# Patient Record
Sex: FEMALE | Race: ASIAN
[De-identification: names, ages, dates, MRNs, and addresses within clinical notes are randomized per-mention and may not be internally consistent; named-entity substitution may affect disease eponyms.]

---

## 2017-09-11 ENCOUNTER — HOSPITAL ENCOUNTER (EMERGENCY)
Dept: HOSPITAL 25 - UCEAST | Age: 52
Discharge: HOME | End: 2017-09-11
Payer: COMMERCIAL

## 2017-09-11 ENCOUNTER — HOSPITAL ENCOUNTER (EMERGENCY)
Dept: HOSPITAL 25 - ED | Age: 52
Discharge: HOME | End: 2017-09-11
Payer: COMMERCIAL

## 2017-09-11 VITALS — DIASTOLIC BLOOD PRESSURE: 77 MMHG | SYSTOLIC BLOOD PRESSURE: 131 MMHG

## 2017-09-11 VITALS — SYSTOLIC BLOOD PRESSURE: 136 MMHG | DIASTOLIC BLOOD PRESSURE: 89 MMHG

## 2017-09-11 DIAGNOSIS — R22.42: Primary | ICD-10-CM

## 2017-09-11 DIAGNOSIS — S80.812A: ICD-10-CM

## 2017-09-11 DIAGNOSIS — M79.662: ICD-10-CM
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
## 2017-09-11 DIAGNOSIS — S80.12XA: Primary | ICD-10-CM
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
## 2017-09-11 PROCEDURE — 99201: CPT

## 2017-09-11 PROCEDURE — G0463 HOSPITAL OUTPT CLINIC VISIT: HCPCS

## 2017-09-11 PROCEDURE — 99282 EMERGENCY DEPT VISIT SF MDM: CPT

## 2017-09-11 NOTE — RAD
INDICATION: Pain and swelling.     



COMPARISON: None

 

TECHNIQUE: Duplex interrogation of the Lowerextremity was performed.



FINDINGS: 



Deep veins: The common femoral, great saphenous, profunda femoris, proximal, mid, and

distal deep femoral, popliteal, posterior tibial, and peroneal veins are patent. There is

normal compressibility, augmentation, and phasic flow.



Superficial veins: There are no findings of superficial thrombophlebitis.



Popliteal fossa:There is no evidence of a popliteal cyst.



Soft tissues: There is a tiny (1.9 x 0.3 x 1.1 cm), elliptical, avascular hypoechoic

structure in the anterior lower extremity which may represent a small hematoma.



IMPRESSION:  SUSPECT SMALL HEMATOMA. NO EVIDENCE OF ACUTE DEEP VENOUS THROMBOSIS..

## 2017-09-11 NOTE — ED
Lower Extremity





- HPI Summary


HPI Summary: 


Pt here w/ Lt shin injury and swelling - was in [place] and as she tried stepping 

up onto a cement platform w/ heavy bags in her hands, couldn't clear her Lt 

foot to step up and struck shin against cement step edge. Minor abrasion and 

swelling. She flew back from [place] 3 days ago and swelling worse, noticed in 

her ankle area. Takes ASA 81mg daily and is on HRT - denies smoking, h/o CA, h/

o clotting. Was seen at  who did not order any studies but sent her here for r

/o DVT as they do not have U/S services there today. Pt denies limited ROM and 

only has pain w/ weight bearing, most notably w/ descending stairs. 





- History of Current Complaint


Chief Complaint: EDExtremityLower


Stated Complaint: LT LEG INJURY-SENT FROM 


Time Seen by Provider: 09/11/17 18:45


Hx Obtained From: Patient, Family/Caretaker - 


Hx Last Menstrual Period: 6 months ago


Pain Intensity: 6





- Allergies/Home Medications


Allergies/Adverse Reactions: 


 Allergies











Allergy/AdvReac Type Severity Reaction Status Date / Time


 


Codeine Allergy  Rash Verified 09/11/17 17:36


 


Latex Allergy  See Comment Verified 09/11/17 17:36


 


Sulfamethoxazole Allergy  Rash Verified 09/11/17 17:36





w/Trimethoprim     





[From Bactrim]     














PMH/Surg Hx/FS Hx/Imm Hx


Previously Healthy: Yes


Endocrine/Hematology History: 


   Denies: Hx Anticoagulant Therapy, Hx Blood Disorders, Hx Coagulopothy


Respiratory History: Reports: Hx Asthma


 History: Reports: Hx Kidney Infection


Neurological History: Reports: Hx Migraine - takes butterbur


Infectious Disease History: 


   Denies: Traveled Outside the [place] in Last 30 Days





- Family History


Known Family History: Positive: None





- Social History


Occupation: Employed Full-time - professor


Lives: With Family


Alcohol Use: None


Hx Substance Use: No


Substance Use Type: Reports: None


Hx Tobacco Use: No


Smoking Status (MU): Never Smoked Tobacco





Review of Systems


Constitutional: Negative


Negative: Fever, Fatigue


Cardiovascular: Negative


Negative: Palpitations, Chest Pain


Respiratory: Negative


Negative: Shortness Of Breath, Cough


Positive: no symptoms reported


Musculoskeletal: Other - see HPI


Skin: Other - see HPI


Neurological: Negative


Negative: Weakness, Paresthesia, Numbness


Psychological: Normal


All Other Systems Reviewed And Are Negative: Yes





Physical Exam


Triage Information Reviewed: Yes


Vital Signs On Initial Exam: 


 Initial Vitals











Temp Pulse Resp BP Pulse Ox


 


 99.4 F   77   18   129/86   99 


 


 09/11/17 18:33  09/11/17 18:33  09/11/17 18:33  09/11/17 18:33  09/11/17 18:33











Vital Signs Reviewed: Yes


Appearance: Positive: Well-Appearing, No Pain Distress, Well-Nourished


Skin: Positive: Warm, Dry - minor superficial abrasion over Lt anterior tibial 

region w/ underlying edema - no ecchymosis, no bleeding


Head/Face: Positive: Normal Head/Face Inspection


Eyes: Positive: EOMI


ENT: Positive: Hearing grossly normal


Respiratory/Lung Sounds: Positive: Breath Sounds Present


Cardiovascular: Positive: Pulses are Symmetrical in both Upper and Lower 

Extremities, Leg Edema Left - (-) Katherine's sign


Musculoskeletal: Positive: Normal, Strength/ROM Intact, Pain @ - mild TTP over 

direct wound area


Neurological: Positive: Normal, Sensory/Motor Intact, Alert, Oriented to Person 

Place, Time, CN Intact II-III, Reflexes Intact


Psychiatric: Positive: Normal





Diagnostics





- Vital Signs


 Vital Signs











  Temp Pulse Resp BP Pulse Ox


 


 09/11/17 18:33  99.4 F  77  18  129/86  99














- Laboratory


Lab Statement: Any lab studies that have been ordered have been reviewed, and 

results considered in the medical decision making process.





Lower Extremity Course/Dx





- Diagnoses


Provider Diagnoses: 


 Traumatic hematoma of left lower leg








Discharge





- Discharge Plan


Condition: Stable


Disposition: HOME


Patient Education Materials:  Hematoma (ED)


Referrals: 


Flory Raymond MD [Primary Care Provider] - 


Additional Instructions: 


Wash wound daily with soap and water - rinse well and apply triple antibiotic 

ointment after with bandaid.


For swelling, rest, ice, elevate


You may take ibuprofen with food for pain/swelling


Follow-up with PCP if pain persists or worsens

## 2017-09-11 NOTE — RAD
INDICATION: Left leg injury     



COMPARISON: None



TECHNIQUE: AP, lateral, and oblique views were obtained.



FINDINGS: The bony structures, joint spaces, and soft tissues are normal for age.



IMPRESSION: NEGATIVE EXAMINATION.

## 2017-09-11 NOTE — UC
Lower Extremity/Ankle HPI





- HPI Summary


HPI Summary: 





51 YEAR OLD FEMALE PRESENTS WITH SEVERE LEFT CALF PAIN AND SWELLING AFTER BLUNT 

TRAUMA IN COMBINATION WITH A 30 HOUR PLANE RIDE FROM [place]. 





- History of Current Complaint


Chief Complaint: UCLowerExtremity


Stated Complaint: LEG INJURY


Time Seen by Provider: 09/11/17 17:45


Hx Obtained From: Patient


Hx Last Menstrual Period: 6 months ago


Onset/Duration: Gradual Onset


Severity Initially: Moderate


Severity Currently: Moderate


Pain Scale Used: 0-10 Numeric - 8


Aggravating Factor(s): Standing, Ambulation





- Allergies/Home Medications


Allergies/Adverse Reactions: 


 Allergies











Allergy/AdvReac Type Severity Reaction Status Date / Time


 


Codeine Allergy  Rash Verified 09/11/17 17:36


 


Latex Allergy  See Comment Verified 09/11/17 17:36


 


Sulfamethoxazole Allergy  Rash Verified 09/11/17 17:36





w/Trimethoprim     





[From Bactrim]     











Home Medications: 


 Home Medications





Butterbur 1 cap PO ONCE 09/11/17 [History Confirmed 09/11/17]


Fexofenadine-Pseudoephedrine [Allegra-D 24 Hour Allergy 180-240 mg] 1 cap PO 

ONCE 09/11/17 [History Confirmed 09/11/17]


Hormone Replacement Therapy 1 cap PO DAILY 09/11/17 [History Confirmed 09/11/17]











PMH/Surg Hx/FS Hx/Imm Hx


Previously Healthy: Yes





- Surgical History


Surgical History: None





- Social History


Alcohol Use: None


Substance Use Type: None


Smoking Status (MU): Never Smoked Tobacco





Review of Systems


Constitutional: Negative


Skin: Negative


Eyes: Negative


ENT: Negative


Respiratory: Negative


Cardiovascular: Negative


Gastrointestinal: Negative


Genitourinary: Negative


Motor: Negative


Neurovascular: Negative


Musculoskeletal: Other: - LEFT CALF SWELLING/PAIN


Neurological: Negative


Psychological: Negative


All Other Systems Reviewed And Are Negative: Yes





Physical Exam


Triage Information Reviewed: Yes


Vital Signs: 


 Initial Vital Signs











Temp  36.3 C   09/11/17 17:30


 


Pulse  90   09/11/17 17:30


 


Resp  18   09/11/17 17:30


 


BP  136/89   09/11/17 17:30


 


Pulse Ox  98   09/11/17 17:30











Eye Exam: Normal


ENT Exam: Normal


Dental Exam: Normal


Neck exam: Normal


Neck: Positive: 1


Respiratory Exam: Normal


Cardiovascular Exam: Normal


Abdominal Exam: Normal


Musculoskeletal Exam: Normal


Neurological Exam: Normal


Psychological Exam: Normal


Skin Exam: Normal





Lower Extremity Course/Dx





- Differential Dx/Diagnosis


Provider Diagnoses: LEFT CALF PAIN/SWELLING





Discharge





- Discharge Plan


Condition: Stable


Disposition: HOME


Patient Education Materials:  Deep Venous Thrombosis (ED), Leg Edema (ED)


Referrals: 


Flory Raymond MD [Primary Care Provider] - 


Additional Instructions: 


PLEASE GO TO ER TO RULE OUT DVT OF LEFT LEG

## 2019-04-24 ENCOUNTER — HOSPITAL ENCOUNTER (OUTPATIENT)
Dept: HOSPITAL 25 - OR | Age: 54
Discharge: HOME | End: 2019-04-24
Attending: SURGERY
Payer: COMMERCIAL

## 2019-04-24 VITALS — DIASTOLIC BLOOD PRESSURE: 95 MMHG | SYSTOLIC BLOOD PRESSURE: 138 MMHG

## 2019-04-24 DIAGNOSIS — K21.9: ICD-10-CM

## 2019-04-24 DIAGNOSIS — K81.1: Primary | ICD-10-CM

## 2019-04-24 DIAGNOSIS — J45.909: ICD-10-CM

## 2019-04-24 PROCEDURE — 81025 URINE PREGNANCY TEST: CPT

## 2019-04-24 PROCEDURE — 88304 TISSUE EXAM BY PATHOLOGIST: CPT

## 2019-04-24 NOTE — OP
Operative Report - Blank





- Operative Report


Date of Operation: 04/24/19


Note: 





Brief Operative Note


Preop Dx: symptomatic cholelithiasis


Postop Dx: same


Procedure: laparoscopic cholecystectomy


Anesthesia: GET


Surgeon: Doug


Assistant: MIGUEL Lopez


Fluids: 1450 ml RL


EBL: < 20 ml


Specimen: gallbladder


Drains: none


Findings: dictated

## 2019-05-07 NOTE — OP
CC:  [facility]; primary care physician *

 

DATE OF OPERATION:  04/24/19 - [facility]

 

DATE OF BIRTH:  [birth year]

 

SURGEON:  Cheng Camacho MD.

 

ASSISTANT:  John Ingram.

 

ANESTHESIA:  General anesthesia.

 

PRE-OP DIAGNOSES:  Symptomatic cholelithiasis and chronic cholecystitis.

 

POST-OP DIAGNOSES:  Symptomatic cholelithiasis and chronic cholecystitis.

 

OPERATIVE PROCEDURE:  Laparoscopic cholecystectomy.

 

ESTIMATED BLOOD LOSS:  Less than 20 cc.

 

FLUIDS:  1450 cc of crystalloid fluid given.

 

SPECIMEN:  Gallbladder.

 

DRAINS:  None.

 

DESCRIPTION OF PROCEDURE:  The patient was identified in the preoperative area. 
She was marked, brought to the operating room and placed on the operating table 
in supine position.  Preoperative antibiotics were given.  Sequential 
compression devices were placed on bilateral lower extremities. General 
anesthesia was induced. The patient's abdomen was prepped and draped in the 
standard surgical fashion.  Time-out was performed.

 

Umbilical incision was made and the skin was elevated and the Veress needle 
inserted into the abdominal cavity, which was then allowed to insufflate to a 
pressure 15 mmHg.  The patient tolerated insufflation well. Veress needle was 
removed and an Optiview 5-mm trocar was inserted into the abdominal cavity.  A 
review of the abdomen showed no evidence of bleeding or intraabdominal injury.

 

Attention was then turned to the right upper quadrant.

 

Table was repositioned and trocars were placed in the following position, a 12 
mm in the subxiphoid and two 5 mm in the right costal margin.

 

Fundus of the gallbladder was grasped and elevated anteriorly.  Omental 
attachments were taken down bluntly and we could see the infundibular region of 
the gallbladder which was grasped and retracted towards the right lower quadrant
, giving us the critical view.

 

Next, the peritoneum was taken off the medial and lateral aspects of the 
gallbladder.  Cystic duct was isolated as was cystic artery, they are both 
doubly clipped and ligated and the gallbladder was removed from the liver bed 
and placed in an endoscopic retrieval bag.  Review of the cystic duct stump, 
cystic artery stump showed no bleeding or bile.  We then placed the patient 
back in neutral position and the gallbladder was removed with its endoscopic 
retrieval bag through the subxiphoid port, passed off as specimen.  We then 
allowed the abdomen to collapse.  Trocars removed under direct vision and all 4 
skin incisions were reapproximated with 4-0 Monocryl subcuticular sutures 
followed by Steri-Strips and sterile dressing.

 

 [id number]/[facility] #: [id number]

MEGHNA